# Patient Record
Sex: FEMALE | Race: WHITE | NOT HISPANIC OR LATINO | Employment: UNEMPLOYED | URBAN - METROPOLITAN AREA
[De-identification: names, ages, dates, MRNs, and addresses within clinical notes are randomized per-mention and may not be internally consistent; named-entity substitution may affect disease eponyms.]

---

## 2024-09-21 ENCOUNTER — APPOINTMENT (OUTPATIENT)
Dept: RADIOLOGY | Facility: MEDICAL CENTER | Age: 34
End: 2024-09-21
Attending: EMERGENCY MEDICINE
Payer: MEDICAID

## 2024-09-21 ENCOUNTER — PHARMACY VISIT (OUTPATIENT)
Dept: PHARMACY | Facility: MEDICAL CENTER | Age: 34
End: 2024-09-21
Payer: COMMERCIAL

## 2024-09-21 ENCOUNTER — HOSPITAL ENCOUNTER (EMERGENCY)
Facility: MEDICAL CENTER | Age: 34
End: 2024-09-21
Attending: EMERGENCY MEDICINE
Payer: MEDICAID

## 2024-09-21 VITALS
TEMPERATURE: 97.6 F | WEIGHT: 185 LBS | OXYGEN SATURATION: 97 % | SYSTOLIC BLOOD PRESSURE: 185 MMHG | RESPIRATION RATE: 18 BRPM | HEIGHT: 63 IN | DIASTOLIC BLOOD PRESSURE: 109 MMHG | BODY MASS INDEX: 32.78 KG/M2 | HEART RATE: 93 BPM

## 2024-09-21 DIAGNOSIS — L03.116 CELLULITIS OF LEFT LOWER EXTREMITY: ICD-10-CM

## 2024-09-21 DIAGNOSIS — S93.402A SPRAIN OF LEFT ANKLE, UNSPECIFIED LIGAMENT, INITIAL ENCOUNTER: ICD-10-CM

## 2024-09-21 PROCEDURE — 700102 HCHG RX REV CODE 250 W/ 637 OVERRIDE(OP): Mod: UD | Performed by: EMERGENCY MEDICINE

## 2024-09-21 PROCEDURE — 73610 X-RAY EXAM OF ANKLE: CPT | Mod: LT

## 2024-09-21 PROCEDURE — 73630 X-RAY EXAM OF FOOT: CPT | Mod: LT

## 2024-09-21 PROCEDURE — A9270 NON-COVERED ITEM OR SERVICE: HCPCS | Mod: UD | Performed by: EMERGENCY MEDICINE

## 2024-09-21 PROCEDURE — RXMED WILLOW AMBULATORY MEDICATION CHARGE: Performed by: EMERGENCY MEDICINE

## 2024-09-21 PROCEDURE — 99284 EMERGENCY DEPT VISIT MOD MDM: CPT

## 2024-09-21 RX ORDER — NAPROXEN 500 MG/1
500 TABLET ORAL 2 TIMES DAILY WITH MEALS
Qty: 60 TABLET | Refills: 0 | Status: SHIPPED | OUTPATIENT
Start: 2024-09-21

## 2024-09-21 RX ORDER — CEPHALEXIN 500 MG/1
500 CAPSULE ORAL ONCE
Status: COMPLETED | OUTPATIENT
Start: 2024-09-21 | End: 2024-09-21

## 2024-09-21 RX ORDER — NAPROXEN 500 MG/1
500 TABLET ORAL ONCE
Status: COMPLETED | OUTPATIENT
Start: 2024-09-21 | End: 2024-09-21

## 2024-09-21 RX ORDER — CEPHALEXIN 500 MG/1
500 CAPSULE ORAL 4 TIMES DAILY
Qty: 20 CAPSULE | Refills: 0 | Status: ACTIVE | OUTPATIENT
Start: 2024-09-21 | End: 2024-09-26

## 2024-09-21 RX ADMIN — CEPHALEXIN 500 MG: 500 CAPSULE ORAL at 06:24

## 2024-09-21 RX ADMIN — NAPROXEN 500 MG: 500 TABLET ORAL at 06:24

## 2024-09-21 NOTE — ED NOTES
Vital signs taken and recorded. Discharge in stable condition via wheelchair accompanied by spouse. Health teachings given to patient  with full understanding of the information given. No personal belongings left.

## 2024-09-21 NOTE — DISCHARGE INSTRUCTIONS
Your blood pressure was noted to be elevated today.  Please have this rechecked by your primary care physician

## 2024-09-21 NOTE — ED PROVIDER NOTES
ED Provider Note    Scribed for Trish Jalloh M.D. by Molina Lamb. 9/21/2024, 5:36 AM.    Primary care provider: Pcp Pt States None  Means of arrival: Private vehicle  History obtained from: Patient  History limited by: None    CHIEF COMPLAINT  Chief Complaint   Patient presents with    Ankle Pain     2 DAYS AGO, a trailer truck wheel fell on her left ankle  Initially it was ok, she can moved it, no swelling  But today, she noticed that the swelling and pain is increasing, she can barely moved the left ankle due to pain       HPI/ROS  Holiday Laura Molina is a 33 y.o. female who presents to the Emergency Department with pain to the left ankle onset this morning. Patient states she dropped a trailer wheel onto her left ankle 3 days ago and observed pain and swelling only this morning.  She states that she was walking around on it and it only caused minimal discomfort after the initial injury.  The patient has not taken anything for the pain. The patient denies being pregnant.    EXTERNAL RECORDS REVIEWED  None pertinent     LIMITATION TO HISTORY   Select: : None    OUTSIDE HISTORIAN(S):  Significant other        PAST MEDICAL HISTORY   has a past medical history of Pre-hypertension.    SURGICAL HISTORY   has a past surgical history that includes gyn surgery.    SOCIAL HISTORY  Social History     Tobacco Use    Smoking status: Some Days     Types: Cigarettes   Vaping Use    Vaping status: Some Days   Substance Use Topics    Alcohol use: No    Drug use: Yes     Types: Inhaled     Comment: marijuana on occasion      Social History     Substance and Sexual Activity   Drug Use Yes    Types: Inhaled    Comment: marijuana on occasion       FAMILY HISTORY  History reviewed. No pertinent family history.    CURRENT MEDICATIONS  Home Medications       Reviewed by Lucía Godoy R.N. (Registered Nurse) on 09/21/24 at 0512  Med List Status: Partial     Medication Last Dose Status   hydrocodone-acetaminophen (NORCO) 5-325  "MG Tab per tablet  Active   ondansetron (ZOFRAN ODT) 4 MG TABLET DISPERSIBLE  Active                    ALLERGIES  Allergies   Allergen Reactions    Other Environmental Hives and Swelling     \"bees\"       PHYSICAL EXAM  VITAL SIGNS: BP (!) 188/129   Pulse (!) 110   Temp 36.5 °C (97.7 °F) (Temporal)   Resp 18   Ht 1.6 m (5' 3\")   Wt 83.9 kg (185 lb)   SpO2 97%   BMI 32.77 kg/m²   Vitals reviewed by myself.  Nursing note and vitals reviewed.  Constitutional: Well-developed and well-nourished.  Anxious appearing  HENT: Head is normocephalic and atraumatic.  Eyes: extra-ocular movements intact  Cardiovascular: Tachycardic rate and regular rhythm. 2+ bilateral distal pedal pulses.    Pulmonary/Chest: Normal respiratory effort  Musculoskeletal: Left lateral ankle superficial abrasion with associated surrounding erythema, patient is able to range her ankle although it is uncomfortable.  Neurological: Awake and alert  Skin: Skin is warm and dry. No rash.           DIAGNOSTIC STUDIES:    RADIOLOGY  Images independently interpreted by myself prior to radiologist review:  -Left ankle x-ray demonstrates no acute bony abnormality    Final interpretation by radiology demonstrates:    DX-FOOT-COMPLETE 3+ LEFT   Final Result      No acute abnormality.      DX-ANKLE 3+ VIEWS LEFT   Final Result      No osseous injury identified. Lateral ankle soft tissue swelling is noted.        The radiologist's interpretation of all radiological studies have been reviewed by me.      COURSE & MEDICAL DECISION MAKING      INITIAL ASSESSMENT, ED COURSE AND PLAN    Patient is a 33-year-old female who presents for evaluation of left ankle pain.  Differential diagnosis includes sprain, strain, fracture, dislocation.  There is an abrasion from where the trailer hit her on her left lateral ankle and it appears she is developing surrounding mild cellulitis which may also be contributing to her discomfort.  Low suspicion for septic joint as the " abrasion is superficial and she is still able to range her foot.  Will obtain x-rays for evaluation of the bones.    Patient's initial vitals are notable for hypertension and tachycardia.  She is slightly anxious appearing and reports that she does get nervous and hypertensive when she is around doctors.  She is advised to have this rechecked by her primary care provider.  She is treated with naproxen for discomfort and Keflex for likely early cellulitis.  X-rays returned and demonstrate no acute bony abnormalities.  Therefore at this time patient will be placed in Ace wrap and crutches.  She will be started on naproxen and Keflex and is given strict return precautions.  Patient is then discharged in stable condition       REASSESSMENTS   5:35 AM - Patient was first seen and evaluated at bedside    6:17 AM - Recheck. Updated patient on imaging results, ongoing management, and plan to discharge.        DISPOSITION AND DISCUSSIONS  I have discussed management of the patient with the following physicians and VALENTIN's:  None    Discussion of management with other [facility] or appropriate source(s): None     Escalation of care considered, and ultimately not performed:see above    Barriers to care at this time, including but not limited to: Patient does not have established PCP.     Decision tools and prescription drugs considered including, but not limited to: see above.    The patient will return for new or worsening symptoms and is stable at the time of discharge.    The patient is referred to a primary physician for blood pressure management, diabetic screening, and for all other preventative health concerns.      DISPOSITION:  Patient will be discharged home in stable condition.    FOLLOW UP:  [place] [id number] [facility] [id number] [phone]          OUTPATIENT MEDICATIONS:  New Prescriptions    CEPHALEXIN (KEFLEX) 500 MG CAP    Take 1 Capsule by mouth 4 times a day for 5 days.     NAPROXEN (NAPROSYN) 500 MG TAB    Take 1 Tablet by mouth 2 times a day with meals.         FINAL IMPRESSION  1. Sprain of left ankle, unspecified ligament, initial encounter    2. Cellulitis of left lower extremity          Molina BUCK (Scribe), am scribing for, and in the presence of, Trish Jalloh M.D..    Electronically signed by: Molina Lamb (Scribe), 9/21/2024    ITrish M.D. personally performed the services described in this documentation, as scribed by Molina Lamb in my presence, and it is both accurate and complete.    The note accurately reflects work and decisions made by me.  Trish Jalloh M.D.  9/21/2024  6:54 AM

## 2024-09-21 NOTE — ED NOTES
Taken patient from triage waiting room, wheelchair with unsteady gait, alert/ oriented x 4.Verified patient identification.  Assumed patient care.   Placed on patient room. Changed clothes to hospital gown. Connected to cardiac monitor.   Given the call light and instructed to call for any assistance needed/ or concerns.   Bed on lowest position, side rails up, breaks locked. Awaiting for ERP.

## 2024-09-21 NOTE — ED TRIAGE NOTES
Chief Complaint   Patient presents with    Ankle Pain     2 DAYS AGO, a trailer truck wheel fell on her left ankle  Initially it was ok, she can moved it, no swelling  But today, she noticed that the swelling and pain is increasing, she can barely moved the left ankle due to pain     Pain: 8/10     Pt came in to triage via wheelchair for the above complaints.     Pt is alert and oriented x 4, speaking in full sentences, follows commands and responds appropriately to questions.     Respirations are even and unlabored.    Pt placed in lobby. Pt educated on triage process.     Pt encouraged to inform staff for any changes in condition or if needs help while waiting to be room in.      Vitals:    09/21/24 0504   BP: (!) 188/129   Pulse: (!) 110   Resp: 18   Temp: 36.5 °C (97.7 °F)   SpO2: 97%